# Patient Record
Sex: MALE | Race: WHITE | Employment: FULL TIME | URBAN - NONMETROPOLITAN AREA
[De-identification: names, ages, dates, MRNs, and addresses within clinical notes are randomized per-mention and may not be internally consistent; named-entity substitution may affect disease eponyms.]

---

## 2017-04-25 PROBLEM — S82.891D: Status: ACTIVE | Noted: 2017-04-25

## 2021-02-11 ENCOUNTER — HOSPITAL ENCOUNTER (OUTPATIENT)
Age: 56
Setting detail: OBSERVATION
Discharge: HOME | End: 2021-02-13
Attending: EMERGENCY MEDICINE | Admitting: INTERNAL MEDICINE
Payer: COMMERCIAL

## 2021-02-11 ENCOUNTER — APPOINTMENT (OUTPATIENT)
Dept: CT IMAGING | Age: 56
End: 2021-02-11

## 2021-02-11 ENCOUNTER — APPOINTMENT (OUTPATIENT)
Dept: GENERAL RADIOLOGY | Age: 56
End: 2021-02-11

## 2021-02-11 DIAGNOSIS — S09.90XA CLOSED HEAD INJURY, INITIAL ENCOUNTER: ICD-10-CM

## 2021-02-11 DIAGNOSIS — R55 SYNCOPE AND COLLAPSE: Primary | ICD-10-CM

## 2021-02-11 LAB
ALBUMIN SERPL-MCNC: 4.4 G/DL (ref 3.5–5.1)
ALP BLD-CCNC: 121 U/L (ref 38–126)
ALT SERPL-CCNC: 22 U/L (ref 11–66)
ANION GAP SERPL CALCULATED.3IONS-SCNC: 9 MEQ/L (ref 8–16)
AST SERPL-CCNC: 18 U/L (ref 5–40)
BASOPHILS # BLD: 0.9 %
BASOPHILS ABSOLUTE: 0.1 THOU/MM3 (ref 0–0.1)
BILIRUB SERPL-MCNC: 0.3 MG/DL (ref 0.3–1.2)
BUN BLDV-MCNC: 13 MG/DL (ref 7–22)
CALCIUM SERPL-MCNC: 9.4 MG/DL (ref 8.5–10.5)
CHLORIDE BLD-SCNC: 103 MEQ/L (ref 98–111)
CO2: 28 MEQ/L (ref 23–33)
CREAT SERPL-MCNC: 1.2 MG/DL (ref 0.4–1.2)
EKG ATRIAL RATE: 81 BPM
EKG P AXIS: 49 DEGREES
EKG P-R INTERVAL: 156 MS
EKG Q-T INTERVAL: 362 MS
EKG QRS DURATION: 84 MS
EKG QTC CALCULATION (BAZETT): 420 MS
EKG R AXIS: 47 DEGREES
EKG T AXIS: 30 DEGREES
EKG VENTRICULAR RATE: 81 BPM
EOSINOPHIL # BLD: 4.3 %
EOSINOPHILS ABSOLUTE: 0.3 THOU/MM3 (ref 0–0.4)
ERYTHROCYTE [DISTWIDTH] IN BLOOD BY AUTOMATED COUNT: 13.3 % (ref 11.5–14.5)
ERYTHROCYTE [DISTWIDTH] IN BLOOD BY AUTOMATED COUNT: 45.5 FL (ref 35–45)
GFR SERPL CREATININE-BSD FRML MDRD: 63 ML/MIN/1.73M2
GLUCOSE BLD-MCNC: 78 MG/DL (ref 70–108)
HCT VFR BLD CALC: 46.2 % (ref 42–52)
HEMOGLOBIN: 15 GM/DL (ref 14–18)
IMMATURE GRANS (ABS): 0.02 THOU/MM3 (ref 0–0.07)
IMMATURE GRANULOCYTES: 0.3 %
LYMPHOCYTES # BLD: 19.7 %
LYMPHOCYTES ABSOLUTE: 1.5 THOU/MM3 (ref 1–4.8)
MCH RBC QN AUTO: 30.1 PG (ref 26–33)
MCHC RBC AUTO-ENTMCNC: 32.5 GM/DL (ref 32.2–35.5)
MCV RBC AUTO: 92.8 FL (ref 80–94)
MONOCYTES # BLD: 8.4 %
MONOCYTES ABSOLUTE: 0.6 THOU/MM3 (ref 0.4–1.3)
NUCLEATED RED BLOOD CELLS: 0 /100 WBC
OSMOLALITY CALCULATION: 278.4 MOSMOL/KG (ref 275–300)
PLATELET # BLD: 228 THOU/MM3 (ref 130–400)
PMV BLD AUTO: 9.4 FL (ref 9.4–12.4)
POTASSIUM REFLEX MAGNESIUM: 3.6 MEQ/L (ref 3.5–5.2)
RBC # BLD: 4.98 MILL/MM3 (ref 4.7–6.1)
SEG NEUTROPHILS: 66.4 %
SEGMENTED NEUTROPHILS ABSOLUTE COUNT: 5 THOU/MM3 (ref 1.8–7.7)
SODIUM BLD-SCNC: 140 MEQ/L (ref 135–145)
TOTAL PROTEIN: 8 G/DL (ref 6.1–8)
TROPONIN T: < 0.01 NG/ML
WBC # BLD: 7.5 THOU/MM3 (ref 4.8–10.8)

## 2021-02-11 PROCEDURE — 84484 ASSAY OF TROPONIN QUANT: CPT

## 2021-02-11 PROCEDURE — 71045 X-RAY EXAM CHEST 1 VIEW: CPT

## 2021-02-11 PROCEDURE — 93005 ELECTROCARDIOGRAM TRACING: CPT | Performed by: EMERGENCY MEDICINE

## 2021-02-11 PROCEDURE — 99220 PR INITIAL OBSERVATION CARE/DAY 70 MINUTES: CPT | Performed by: INTERNAL MEDICINE

## 2021-02-11 PROCEDURE — 72125 CT NECK SPINE W/O DYE: CPT

## 2021-02-11 PROCEDURE — 36415 COLL VENOUS BLD VENIPUNCTURE: CPT

## 2021-02-11 PROCEDURE — 70450 CT HEAD/BRAIN W/O DYE: CPT

## 2021-02-11 PROCEDURE — 6370000000 HC RX 637 (ALT 250 FOR IP): Performed by: EMERGENCY MEDICINE

## 2021-02-11 PROCEDURE — 2580000003 HC RX 258: Performed by: EMERGENCY MEDICINE

## 2021-02-11 PROCEDURE — 85025 COMPLETE CBC W/AUTO DIFF WBC: CPT

## 2021-02-11 PROCEDURE — 99285 EMERGENCY DEPT VISIT HI MDM: CPT

## 2021-02-11 PROCEDURE — 80053 COMPREHEN METABOLIC PANEL: CPT

## 2021-02-11 RX ORDER — 0.9 % SODIUM CHLORIDE 0.9 %
1000 INTRAVENOUS SOLUTION INTRAVENOUS ONCE
Status: COMPLETED | OUTPATIENT
Start: 2021-02-11 | End: 2021-02-12

## 2021-02-11 RX ORDER — ACETAMINOPHEN 500 MG
1000 TABLET ORAL ONCE
Status: COMPLETED | OUTPATIENT
Start: 2021-02-12 | End: 2021-02-11

## 2021-02-11 RX ADMIN — SODIUM CHLORIDE 1000 ML: 9 INJECTION, SOLUTION INTRAVENOUS at 22:38

## 2021-02-11 RX ADMIN — ACETAMINOPHEN 1000 MG: 500 TABLET ORAL at 23:42

## 2021-02-11 ASSESSMENT — PAIN DESCRIPTION - FREQUENCY: FREQUENCY: CONTINUOUS

## 2021-02-11 ASSESSMENT — ENCOUNTER SYMPTOMS
ABDOMINAL PAIN: 0
PHOTOPHOBIA: 0
SORE THROAT: 0
COLOR CHANGE: 0
SINUS PRESSURE: 0
EYE REDNESS: 0
VOMITING: 0
CHEST TIGHTNESS: 0
NAUSEA: 0
COUGH: 0
BACK PAIN: 0

## 2021-02-11 ASSESSMENT — PAIN DESCRIPTION - PAIN TYPE: TYPE: ACUTE PAIN

## 2021-02-11 ASSESSMENT — PAIN SCALES - GENERAL: PAINLEVEL_OUTOF10: 7

## 2021-02-11 ASSESSMENT — PAIN DESCRIPTION - PROGRESSION: CLINICAL_PROGRESSION: NOT CHANGED

## 2021-02-11 NOTE — LETTER
[address] [facility] [address]  Phone: [phone]             February 13, 2021    Patient: Saeid Malik   YOB: 1965   Date of Visit: 2/11/2021       To Whom It May Concern:    Eduar Billings was seen and treated in our facility  beginning 2/11/2021 until 2/13/21. Patient may not drive or return to work until cleared by Cardiology.        Sincerely,       Brigido Kohler RN         Signature:__________________________________

## 2021-02-12 ENCOUNTER — APPOINTMENT (OUTPATIENT)
Dept: INTERVENTIONAL RADIOLOGY/VASCULAR | Age: 56
End: 2021-02-12

## 2021-02-12 LAB
ANION GAP SERPL CALCULATED.3IONS-SCNC: 11 MEQ/L (ref 8–16)
BUN BLDV-MCNC: 12 MG/DL (ref 7–22)
CALCIUM SERPL-MCNC: 9 MG/DL (ref 8.5–10.5)
CHLORIDE BLD-SCNC: 108 MEQ/L (ref 98–111)
CO2: 25 MEQ/L (ref 23–33)
CREAT SERPL-MCNC: 1.1 MG/DL (ref 0.4–1.2)
GFR SERPL CREATININE-BSD FRML MDRD: 69 ML/MIN/1.73M2
GLUCOSE BLD-MCNC: 128 MG/DL (ref 70–108)
GLUCOSE BLD-MCNC: 190 MG/DL (ref 70–108)
GLUCOSE BLD-MCNC: 98 MG/DL (ref 70–108)
GLUCOSE BLD-MCNC: 99 MG/DL (ref 70–108)
LV EF: 60 %
LVEF MODALITY: NORMAL
POTASSIUM REFLEX MAGNESIUM: 4.1 MEQ/L (ref 3.5–5.2)
SEDIMENTATION RATE, ERYTHROCYTE: 20 MM/HR (ref 0–10)
SODIUM BLD-SCNC: 144 MEQ/L (ref 135–145)
TROPONIN T: < 0.01 NG/ML
TSH SERPL DL<=0.05 MIU/L-ACNC: 0.41 UIU/ML (ref 0.4–4.2)

## 2021-02-12 PROCEDURE — G0378 HOSPITAL OBSERVATION PER HR: HCPCS

## 2021-02-12 PROCEDURE — 96372 THER/PROPH/DIAG INJ SC/IM: CPT

## 2021-02-12 PROCEDURE — 99244 OFF/OP CNSLTJ NEW/EST MOD 40: CPT | Performed by: NUCLEAR MEDICINE

## 2021-02-12 PROCEDURE — 93306 TTE W/DOPPLER COMPLETE: CPT

## 2021-02-12 PROCEDURE — 6360000002 HC RX W HCPCS: Performed by: STUDENT IN AN ORGANIZED HEALTH CARE EDUCATION/TRAINING PROGRAM

## 2021-02-12 PROCEDURE — 80048 BASIC METABOLIC PNL TOTAL CA: CPT

## 2021-02-12 PROCEDURE — 93010 ELECTROCARDIOGRAM REPORT: CPT | Performed by: INTERNAL MEDICINE

## 2021-02-12 PROCEDURE — 36415 COLL VENOUS BLD VENIPUNCTURE: CPT

## 2021-02-12 PROCEDURE — 2580000003 HC RX 258: Performed by: STUDENT IN AN ORGANIZED HEALTH CARE EDUCATION/TRAINING PROGRAM

## 2021-02-12 PROCEDURE — 85651 RBC SED RATE NONAUTOMATED: CPT

## 2021-02-12 PROCEDURE — 86038 ANTINUCLEAR ANTIBODIES: CPT

## 2021-02-12 PROCEDURE — 82948 REAGENT STRIP/BLOOD GLUCOSE: CPT

## 2021-02-12 PROCEDURE — 84443 ASSAY THYROID STIM HORMONE: CPT

## 2021-02-12 PROCEDURE — 93880 EXTRACRANIAL BILAT STUDY: CPT

## 2021-02-12 PROCEDURE — 84484 ASSAY OF TROPONIN QUANT: CPT

## 2021-02-12 PROCEDURE — 99225 PR SBSQ OBSERVATION CARE/DAY 25 MINUTES: CPT | Performed by: INTERNAL MEDICINE

## 2021-02-12 RX ORDER — ACETAMINOPHEN 650 MG/1
650 SUPPOSITORY RECTAL EVERY 6 HOURS PRN
Status: DISCONTINUED | OUTPATIENT
Start: 2021-02-12 | End: 2021-02-13 | Stop reason: HOSPADM

## 2021-02-12 RX ORDER — SODIUM CHLORIDE 0.9 % (FLUSH) 0.9 %
10 SYRINGE (ML) INJECTION PRN
Status: DISCONTINUED | OUTPATIENT
Start: 2021-02-12 | End: 2021-02-13 | Stop reason: HOSPADM

## 2021-02-12 RX ORDER — SODIUM CHLORIDE 9 MG/ML
INJECTION, SOLUTION INTRAVENOUS CONTINUOUS
Status: DISCONTINUED | OUTPATIENT
Start: 2021-02-12 | End: 2021-02-13

## 2021-02-12 RX ORDER — ACETAMINOPHEN 325 MG/1
650 TABLET ORAL EVERY 6 HOURS PRN
Status: DISCONTINUED | OUTPATIENT
Start: 2021-02-12 | End: 2021-02-13 | Stop reason: HOSPADM

## 2021-02-12 RX ORDER — ONDANSETRON 2 MG/ML
4 INJECTION INTRAMUSCULAR; INTRAVENOUS EVERY 6 HOURS PRN
Status: DISCONTINUED | OUTPATIENT
Start: 2021-02-12 | End: 2021-02-13 | Stop reason: HOSPADM

## 2021-02-12 RX ORDER — SODIUM CHLORIDE 0.9 % (FLUSH) 0.9 %
10 SYRINGE (ML) INJECTION EVERY 12 HOURS SCHEDULED
Status: DISCONTINUED | OUTPATIENT
Start: 2021-02-12 | End: 2021-02-13 | Stop reason: HOSPADM

## 2021-02-12 RX ORDER — POLYETHYLENE GLYCOL 3350 17 G/17G
17 POWDER, FOR SOLUTION ORAL DAILY PRN
Status: DISCONTINUED | OUTPATIENT
Start: 2021-02-12 | End: 2021-02-13 | Stop reason: HOSPADM

## 2021-02-12 RX ORDER — PROMETHAZINE HYDROCHLORIDE 25 MG/1
12.5 TABLET ORAL EVERY 6 HOURS PRN
Status: DISCONTINUED | OUTPATIENT
Start: 2021-02-12 | End: 2021-02-13 | Stop reason: HOSPADM

## 2021-02-12 RX ADMIN — ENOXAPARIN SODIUM 40 MG: 40 INJECTION SUBCUTANEOUS at 08:56

## 2021-02-12 RX ADMIN — SODIUM CHLORIDE: 9 INJECTION, SOLUTION INTRAVENOUS at 01:25

## 2021-02-12 ASSESSMENT — PAIN SCALES - GENERAL
PAINLEVEL_OUTOF10: 0

## 2021-02-12 ASSESSMENT — PAIN SCALES - WONG BAKER
WONGBAKER_NUMERICALRESPONSE: 0
WONGBAKER_NUMERICALRESPONSE: 0

## 2021-02-12 NOTE — ED NOTES
ED to inpatient nurses report    Chief Complaint   Patient presents with    Loss of Consciousness     Work injury     Fall      Present to ED from work  LOC: alert and orientated to name, place, date  Vital signs   Vitals:    02/11/21 2044 02/11/21 2233   BP: (!) 150/89    Pulse: 98    Resp: 16 16   Temp: 97.6 °F (36.4 °C)    TempSrc: Oral    SpO2: 99% 99%   Weight: 232 lb (105.2 kg)    Height: 6' 2\" (1.88 m)       Oxygen Baseline room air     Current needs required none Bipap/Cpap No  LDAs:   Peripheral IV 02/11/21 Right Antecubital (Active)   Site Assessment Clean;Dry; Intact 02/11/21 2113   Line Status Blood return noted; Flushed;Specimen collected;Normal saline locked 02/11/21 2113   Dressing Status Clean;Dry; Intact 02/11/21 2113     Mobility: Independent  Pending ED orders: None   Present condition: Stable     Electronically signed by Monika Marquez RN on 2/12/2021 at 12:03 AM       Monika Marquez RN  02/12/21 0003

## 2021-02-12 NOTE — CARE COORDINATION
2/12/21, 10:09 AM EST  DISCHARGE PLANNING EVALUATION:    Tiffanie Sandoval       Admitted: 2/11/2021/ 2041   Hospital day: 0   Location: [address] [place] [address]A Reason for admit: Syncope, unspecified syncope type [R55]   PMH:  has a past medical history of Arthritis. Procedure: No  Barriers to Discharge: To ER with LOC at work with resulting fall and lt maxilla abrasion/contusion. Ortho VS. Consult to Cardiology. Telemetry. Neuro checks. IVF at 100/hr. PCP: Kane Lara MD    Patient Goals/Plan/Treatment Preferences: Met with pt today. From home with spouse and other family members. No services or DME. Pt states he works but uninsured. He has a PCP, he drives and no issues with meds. This CM phoned Jaiden Leija in [employer] to request a visit with pt regarding insurance. Transportation/Food Security/Housekeeping Addressed:  No issues identified. 2/12/21, 1:44 PM EST    Patient goals/plan/ treatment preferences discussed by  and . Patient goals/plan/ treatment preferences reviewed with patient/ family. Patient/ family verbalize understanding of discharge plan and are in agreement with goal/plan/treatment preferences. Understanding was demonstrated using the teach back method. AVS provided by RN at time of discharge, which includes all necessary medical information pertaining to the patients current course of illness, treatment, post-discharge goals of care, and treatment preferences. Potential discharge in next 24-48 hours. No needs voiced at present time.

## 2021-02-12 NOTE — ED NOTES
Pt resting in bed with call light in reach and friend at bedside. No distress noted at this time, no needs stated.        [facility]  02/11/21 [id number]

## 2021-02-12 NOTE — ED NOTES
Pt to ED from work for syncopal episode that occurred at approximately 1930 this evening, as pt attempted to stand from sitting position and fell face first without breaking fall with arms. Pt has abrasion to left cheek and right elbow pain with no bruising present at this time. Pt states headache with pain rated 7/10 at this time. Pt states no blurred vision or any other symptoms at this time. No distress noted.        [facility]  02/11/21 2052

## 2021-02-12 NOTE — ED PROVIDER NOTES
[facility] ENCOUNTER          Pt Name: Piyush Bishop  MRN: [id number]  [place] 1965  Date of evaluation: 2/11/2021  Emergency Physician: Johnna Cordero DO    CHIEF COMPLAINT       Chief Complaint   Patient presents with    Loss of Consciousness     Work injury     Fall     History obtained from the patient. HISTORY OF PRESENT ILLNESS    HPI  Piyush Bishop is a 54 y.o. male who presents to the emergency department for evaluation of syncope. Patient was sitting on a chair. He states he fell forward. He states it was not related to position change. His coworker was with him states he was slumped over and saw him fall to the floor. He states he was unprotected and struck his left side of his face on the floor. Reports 7 out of 10 left-sided facial pain. The patient has no other acute complaints at this time. REVIEW OF SYSTEMS   Review of Systems   Constitutional: Negative for activity change, chills and fever. HENT: Negative for congestion, sinus pressure and sore throat. Eyes: Negative for photophobia, redness and visual disturbance. Respiratory: Negative for cough and chest tightness. Cardiovascular: Negative for chest pain, palpitations and leg swelling. Gastrointestinal: Negative for abdominal pain, nausea and vomiting. Endocrine: Negative for polydipsia and polyuria. Genitourinary: Negative for decreased urine volume, difficulty urinating, dysuria, flank pain, frequency and urgency. Musculoskeletal: Negative for back pain, myalgias and neck pain. Skin: Negative for color change and rash. Neurological: Positive for syncope and headaches. Negative for weakness. Hematological: Negative for adenopathy. Does not bruise/bleed easily. Psychiatric/Behavioral: Negative for confusion and self-injury. PAST MEDICAL AND SURGICAL HISTORY   History reviewed. No pertinent past medical history.   Past Surgical History:   Procedure Laterality Date    ANKLE FRACTURE SURGERY Right 04/25/2017    ORIF    KNEE SURGERY           MEDICATIONS   No current facility-administered medications for this encounter. No current outpatient medications on file. SOCIAL HISTORY     Social History     Social History Narrative    Not on file     Social History     Tobacco Use    Smoking status: Current Every Day Smoker     Packs/day: 0.50     Years: 30.00     Pack years: 15.00     Types: Cigarettes   Substance Use Topics    Alcohol use: No    Drug use: No         ALLERGIES   No Known Allergies      FAMILY HISTORY   History reviewed. No pertinent family history. PHYSICAL EXAM     ED Triage Vitals [02/11/21 2044]   BP Temp Temp Source Pulse Resp SpO2 Height Weight   (!) 150/89 97.6 °F (36.4 °C) Oral 98 16 99 % 6' 2\" (1.88 m) 232 lb (105.2 kg)         Additional Vital Signs:  Vitals:    02/11/21 2233   BP:    Pulse:    Resp: 16   Temp:    SpO2: 99%       Physical Exam  Vitals signs and nursing note reviewed. Constitutional:       General: He is not in acute distress. Appearance: He is well-developed. He is not diaphoretic. HENT:      Head: Normocephalic. Comments: Left maxilla abrasion and contusion. Eyes:      Pupils: Pupils are equal, round, and reactive to light. Neck:      Musculoskeletal: Normal range of motion and neck supple. No muscular tenderness. Vascular: No JVD. Cardiovascular:      Rate and Rhythm: Normal rate and regular rhythm. Heart sounds: Normal heart sounds. Pulmonary:      Effort: Pulmonary effort is normal.      Breath sounds: Normal breath sounds. Abdominal:      General: Bowel sounds are normal. There is no distension. Palpations: Abdomen is soft. Tenderness: There is no abdominal tenderness. Musculoskeletal: Normal range of motion. General: No tenderness or deformity. Skin:     General: Skin is warm and dry.       Capillary Refill: Capillary refill takes less than 2 seconds. Neurological:      Mental Status: He is alert and oriented to person, place, and time. Comments: Non-focal. Moves all extremities. Initial vital signs and nursing assessment reviewed and abnormal from Hypertension. Pulsoximetry is normal per my interpretation. MEDICAL DECISION MAKING   Initial Assessment: Given the patient's above chief complaint and findings on history and physical examination, I thought it was appropriate to consider the following emergency medical conditions: Syncope, dysrhythmia, anemia, orthostasis, ACS, closed head injury although some of these diagnoses are unlikely they were considered in my medical decision making. Plan: CBC, BMP, ECG, troponin, orthostatic vital signs symptomatic treatment with IV hydration, and reassess         ED RESULTS   Laboratory results:  Labs Reviewed   CBC WITH AUTO DIFFERENTIAL - Abnormal; Notable for the following components:       Result Value    RDW-SD 45.5 (*)     All other components within normal limits   GLOMERULAR FILTRATION RATE, ESTIMATED - Abnormal; Notable for the following components:    Est, Glom Filt Rate 63 (*)     All other components within normal limits   COMPREHENSIVE METABOLIC PANEL W/ REFLEX TO MG FOR LOW K   ANION GAP   OSMOLALITY   TROPONIN       Radiologic studies results:  CT HEAD WO CONTRAST   Final Result   Impression:   1. No acute intracranial hemorrhage or process identified. This document has been electronically signed by: Nerissa Costa MD on    02/11/2021 11:07 PM      All CTs at this facility use dose modulation techniques and iterative    reconstructions, and/or weight-based dosing   when appropriate to reduce radiation to a low as reasonably achievable. CT CERVICAL SPINE WO CONTRAST   Final Result   Impression:   1. No acute cervical spine fracture identified.    2.  Multilevel degenerative disease of the cervical spine      This document has been electronically signed by: Jean-Claude Carrillo MD on    02/11/2021 11:09 PM      All CTs at this facility use dose modulation techniques and iterative    reconstructions, and/or weight-based dosing   when appropriate to reduce radiation to a low as reasonably achievable. XR CHEST PORTABLE   Final Result   Impression:   1. No acute cardiopulmonary disease seen. This document has been electronically signed by: Jean-Claude Carrillo MD on    02/11/2021 11:03 PM          ED Medications administered this visit:   Medications   0.9 % sodium chloride bolus (1,000 mLs Intravenous New Bag 2/11/21 9608)   acetaminophen (TYLENOL) tablet 1,000 mg (1,000 mg Oral Given 2/11/21 2342)     ECG with normal sinus rhythm no acute ST-T wave changes normal QT and IN intervals. ED COURSE      Patient admitted to hospitalist service unprovoked syncope for telemetry and cardiac monitoring. Discussed with hospitalist patient accepted for admission. The diagnosis, extensive differential diagnosis, laboratory and imaging findings were discussed at the bedside. The patient was an active participant in their care. They are agreeable to the plan of care. All questions and concerns were addressed at the time of the encounter. MEDICATION CHANGES     DISCHARGE MEDICATIONS:  New Prescriptions    No medications on file            FINAL DISPOSITION     Final diagnoses:   Syncope and collapse   Closed head injury, initial encounter     Condition: condition: good  Dispo: Admit to telemetry    PATIENT REFERRED TO:  No follow-up provider specified. Critical Care Time   None      This transcription was electronically signed. Parts of this transcriptions may have been dictated by use of voice recognition software and electronically transcribed, and parts may have been transcribed with the assistance of an ED scribe. The transcription may contain errors not detected in proofreading.     Electronically Signed: Karen Montes, 02/11/21, 11:43 PM      Karen Montes

## 2021-02-12 NOTE — H&P
History & Physical        Patient:  Jose Armando Leon  YOB: 1965    MRN: [id number]     Acct: [de-identified]    PCP: Juwan Miramontes MD    Date of Admission: 2/11/2021    Date of Service: Pt seen/examined on 02/12/21  and Admitted to Observation with expected LOS less than two midnights due to medical therapy. Chief Complaint:  Syncope    ASSESSMENT/PLAN:    Syncope: Suspect vasovagal. Patient had witnessed mechanical fall from seated position at lunch time. Differential diagnosis includes arrhythmia, structural heart, reflex, orthostasis. Orthostatic vitals negative, EKG negative with no events on monitor, no leg swelling or edema appreciated on exam.  Patient has no known pre-existing medical conditions. Family Hx for MS & early MI in mother. Recommend Echo outpatient for evaluation of structural heart disease. Elevated EGFR: Estimated GFR on arrival 63. Unclear if this represents new KRIS or existing CKD. No hx of diabetes or HTN. Check HbA1c, ESR, CRP, BIN. Recommend follow-up BMP on discharge to evaluate for chronic kidney disease. Will administer IVF overnight and assess BMP in AM      History Of Present Illness:    Julio C Marin is a 42-year-old current everyday smoker with a past medical history significant only for lower extremity fractures who presents to [facility] ED on 2/12/2021 following an acute syncopal episode. Patient had been sitting in a chair eating lunch at time of event. Patient next remembers waking up on the floor. Witnesses report patient slumped out of chair falling onto his left side. Patient reports he has not skipped any meals and was well-hydrated at the time of the event. No hx of recent illness, fevers/chills, n/v/d, or recent life stressors. 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 Patient reports 7/10 aching periorbital pain and headache but otherwise has no symptoms at time of interview.   Patient has no history of diabetes, hypertension, heart disease. Family history significant for MI (age 35) and multiple sclerosis in mother. Patient vitals on arrival T 97.6 RR 16 HR 98 /89 SPO2 99% on room air. Troponin negative on arrival.  CBC demonstrates WBC, H&H, platelets WNL. BMP demonstrates serum glucose of 78, BUN/creatinine 13/1.2 otherwise unremarkable. Liver enzymes WNL. CT head and neck demonstrates no acute process, CXR no acute process. Orthostatic vitals WNL. Patient was noted to have contusions over face on arrival and was given 1 L NS and 1 g Tylenol. Admitted for overnight observation. Past Medical History:          Diagnosis Date    Arthritis        Past Surgical History:          Procedure Laterality Date    ANKLE FRACTURE SURGERY Right 04/25/2017    ORIF    FRACTURE SURGERY      KNEE SURGERY         Medications Prior to Admission:      Prior to Admission medications    Not on File       Allergies:  Patient has no known allergies. Social History:      The patient currently lives at home with wife    TOBACCO:   reports that he has been smoking cigarettes. He has a 15.00 pack-year smoking history. He has never used smokeless tobacco.  ETOH:   reports no history of alcohol use. Family History:       Reviewed in detail and negative for DM, CAD, Cancer, CVA. Positive as follows:          Problem Relation Age of Onset    Heart Attack Mother 35    Mult Sclerosis Mother    Patient never met father    Diet:  DIET GENERAL;    REVIEW OF SYSTEMS:   Pertinent positives as noted in the HPI. All other systems reviewed and negative. PHYSICAL EXAM:    BP (!) 150/88   Pulse 76   Temp 98.1 °F (36.7 °C) (Oral)   Resp 18   Ht 6' 2\" (1.88 m)   Wt 232 lb (105.2 kg)   SpO2 98%   BMI 29.79 kg/m²     General appearance: Middle-aged white male alert and oriented x3 in no acute distress  HEENT: Edentulous, normal cephalic, atraumatic without obvious deformity. Pupils equal, round, and reactive to light.   Extra appropriate to reduce radiation to a low as reasonably achievable. CT CERVICAL SPINE WO CONTRAST   Final Result   Impression:   1. No acute cervical spine fracture identified. 2.  Multilevel degenerative disease of the cervical spine      This document has been electronically signed by: Michael Browne MD on    02/11/2021 11:09 PM      All CTs at this facility use dose modulation techniques and iterative    reconstructions, and/or weight-based dosing   when appropriate to reduce radiation to a low as reasonably achievable. XR CHEST PORTABLE   Final Result   Impression:   1. No acute cardiopulmonary disease seen. This document has been electronically signed by: Michael Browne MD on    02/11/2021 11:03 PM          CXR: I have reviewed the CXR with the following interpretation: No acute process   EKG:  I have reviewed the EKG with the following interpretation: NSR w/ peaked T waves V2  CT Head: Small area of enhancement noted at superior aspect of central sulcus  CT Neck: Demonstrates disc narrowing suggestive of DDD    CT HEAD WO CONTRAST   Final Result   Impression:   1. No acute intracranial hemorrhage or process identified. This document has been electronically signed by: Michael Browne MD on    02/11/2021 11:07 PM      All CTs at this facility use dose modulation techniques and iterative    reconstructions, and/or weight-based dosing   when appropriate to reduce radiation to a low as reasonably achievable. CT CERVICAL SPINE WO CONTRAST   Final Result   Impression:   1. No acute cervical spine fracture identified. 2.  Multilevel degenerative disease of the cervical spine      This document has been electronically signed by: Michael Browne MD on    02/11/2021 11:09 PM      All CTs at this facility use dose modulation techniques and iterative    reconstructions, and/or weight-based dosing   when appropriate to reduce radiation to a low as reasonably achievable.       XR CHEST

## 2021-02-12 NOTE — PROGRESS NOTES
Hospitalist Progress Note      Patient:  Se Melissa    Unit/Bed:8B-37/037-A  YOB: 1965  MRN: [id number]   Acct: [de-identified]     PCP: Kristan Choudhury MD  Date of Admission: 2/11/2021    Chief Complaint:  Syncope    ASSESSMENT/PLAN:    Syncope: Suspect vasovagal. Patient had witnessed mechanical fall from seated position at lunch time. Differential diagnosis includes arrhythmia, structural heart, reflex, orthostasis. Orthostatic vitals negative, EKG negative    2/12-we will consult cardiology given with no prodromal symptoms- questionable arrhythmia?- we will add 2D echo of the heart, cardiology consulted, carotid Dopplers, do orthostats-likely will need Holter monitor at discharge        History Of Present Illness:    Darlene Kennedy is a 49-year-old current everyday smoker with a past medical history significant only for lower extremity fractures who presents to [facility] ED on 2/12/2021 following an acute syncopal episode. Patient had been sitting in a chair eating lunch at time of event. Patient next remembers waking up on the floor. Witnesses report patient slumped out of chair falling onto his left side. Patient reports he has not skipped any meals and was well-hydrated at the time of the event. No hx of recent illness, fevers/chills, n/v/d, or recent life stressors. 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 Patient reports 7/10 aching periorbital pain and headache but otherwise has no symptoms at time of interview. Patient has no history of diabetes, hypertension, heart disease. Family history significant for MI (age 35) and multiple sclerosis in mother. Patient vitals on arrival T 97.6 RR 16 HR 98 /89 SPO2 99% on room air. Troponin negative on arrival.  CBC demonstrates WBC, H&H, platelets WNL. BMP demonstrates serum glucose of 78, BUN/creatinine 13/1.2 otherwise unremarkable. Liver enzymes WNL.   CT head and neck demonstrates no acute process, CXR no acute process. Orthostatic vitals WNL. Patient was noted to have contusions over face on arrival and was given 1 L NS and 1 g Tylenol. Admitted for overnight observation. Subjective:- (Last 24 hours)    Doing okay  No dizziness no chest pain no shortness of breath no nausea no vomiting    Past medical history, family history, social history and allergies reviewed again and is unchanged since admission. ROS (12 point review of systems completed. Pertinent positives noted. Otherwise ROS is negative)      Scheduled Meds:   sodium chloride flush  10 mL Intravenous 2 times per day    enoxaparin  40 mg Subcutaneous Daily     Continuous Infusions:   sodium chloride 100 mL/hr at 02/12/21 0125     PRN Meds:.sodium chloride flush, promethazine **OR** ondansetron, polyethylene glycol, acetaminophen **OR** acetaminophen     Diet:  DIET GENERAL;    REVIEW OF SYSTEMS:   Pertinent positives as noted in the HPI. All other systems reviewed and negative. PHYSICAL EXAM:    /77   Pulse 79   Temp 98.2 °F (36.8 °C) (Oral)   Resp 18   Ht 6' 2\" (1.88 m)   Wt 232 lb (105.2 kg)   SpO2 98%   BMI 29.79 kg/m²     General appearance: Middle-aged white male alert and oriented x3 in no acute distress  HEENT: normal cephalic, atraumatic without obvious deformity. Pupils equal, round, and reactive to light. Extra ocular muscles intact. Conjunctivae/corneas clear. Neck: Supple, with full range of motion. No jugular venous distention. Trachea midline. Respiratory:  Normal respiratory effort. Clear to auscultation, bilaterally without Rales/Wheezes/Rhonchi. Cardiovascular:  Regular rate and rhythm with normal S1/S2 without murmurs, rubs, gallops, or ectopic beat  Abdomen: soft, non-tender, non-distended with normal bowel sounds. Musculoskeletal:  No clubbing, cyanosis or edema bilaterally. Skin: Skin color, texture, turgor normal.  No rashes or lesions. Neurologic:  Cranial nerves: II-XII intact. and/or weight-based dosing   when appropriate to reduce radiation to a low as reasonably achievable. CT CERVICAL SPINE WO CONTRAST   Final Result   Impression:   1. No acute cervical spine fracture identified. 2.  Multilevel degenerative disease of the cervical spine      This document has been electronically signed by: Maxwell Dillon MD on    02/11/2021 11:09 PM      All CTs at this facility use dose modulation techniques and iterative    reconstructions, and/or weight-based dosing   when appropriate to reduce radiation to a low as reasonably achievable. XR CHEST PORTABLE   Final Result   Impression:   1. No acute cardiopulmonary disease seen. This document has been electronically signed by: Maxwell Dillon MD on    02/11/2021 11:03 PM          CXR: I have reviewed the CXR with the following interpretation: No acute process   EKG:  I have reviewed the EKG with the following interpretation: NSR w/ peaked T waves V2  CT Head: Small area of enhancement noted at superior aspect of central sulcus  CT Neck: Demonstrates disc narrowing suggestive of DDD    VL DUP CAROTID BILATERAL   Final Result      1. RIGHT ICA . Darrell Dirk Darrell Dirk Unremarkable . Darrell Dirk 2. RIGHT ECA. Darrell Dirk Unremarkable . .... Darrell Dirk RIGHT CCA. Darrell Dirk Unremarkable . Darrell Dirk 3. RIGHT VERTEBRAL. Darrell Dirk Antegrade flow . .. ... LEFT VERTEBRAL. .. Antegrade flow. ..      4. LEFT ICA. .... Unremarkable. .   5. LEFT ECA. .. Unremarkable. .... LEFT CCA. .. Unremarkable. **This report has been created using voice recognition software. It may contain minor errors which are inherent in voice recognition technology. **      Final report electronically signed by Dr. Elida Boyd on 2/12/2021 1:49 PM      CT HEAD WO CONTRAST   Final Result   Impression:   1. No acute intracranial hemorrhage or process identified.       This document has been electronically signed by: Maxwell Dillon MD on    02/11/2021 11:07 PM      All CTs at this facility use dose modulation techniques and iterative    reconstructions, and/or weight-based dosing   when appropriate to reduce radiation to a low as reasonably achievable. CT CERVICAL SPINE WO CONTRAST   Final Result   Impression:   1. No acute cervical spine fracture identified. 2.  Multilevel degenerative disease of the cervical spine      This document has been electronically signed by: Mely Mobley MD on    02/11/2021 11:09 PM      All CTs at this facility use dose modulation techniques and iterative    reconstructions, and/or weight-based dosing   when appropriate to reduce radiation to a low as reasonably achievable. XR CHEST PORTABLE   Final Result   Impression:   1. No acute cardiopulmonary disease seen. This document has been electronically signed by: Mely Mobley MD on    02/11/2021 11:03 PM           DVT prophylaxis: Lovenox    Code Status: Full Code      PT/OT Eval Status: not indicated    Disposition:Home        Thank you Eligio Benito MD for the opportunity to be involved in this patient's care.     Electronically signed by Alvaro Blount MD on 2/12/2021 at 6:50 PM

## 2021-02-13 VITALS
HEIGHT: 74 IN | HEART RATE: 66 BPM | BODY MASS INDEX: 29.77 KG/M2 | OXYGEN SATURATION: 98 % | SYSTOLIC BLOOD PRESSURE: 139 MMHG | WEIGHT: 232 LBS | DIASTOLIC BLOOD PRESSURE: 75 MMHG | RESPIRATION RATE: 18 BRPM | TEMPERATURE: 97.6 F

## 2021-02-13 LAB
GLUCOSE BLD-MCNC: 107 MG/DL (ref 70–108)
GLUCOSE BLD-MCNC: 116 MG/DL (ref 70–108)
GLUCOSE BLD-MCNC: 124 MG/DL (ref 70–108)
TROPONIN T: < 0.01 NG/ML

## 2021-02-13 PROCEDURE — 82948 REAGENT STRIP/BLOOD GLUCOSE: CPT

## 2021-02-13 PROCEDURE — 99217 PR OBSERVATION CARE DISCHARGE MANAGEMENT: CPT | Performed by: NURSE PRACTITIONER

## 2021-02-13 PROCEDURE — 93270 REMOTE 30 DAY ECG REV/REPORT: CPT

## 2021-02-13 PROCEDURE — G0378 HOSPITAL OBSERVATION PER HR: HCPCS

## 2021-02-13 PROCEDURE — 99219 PR INITIAL OBSERVATION CARE/DAY 50 MINUTES: CPT | Performed by: PSYCHIATRY & NEUROLOGY

## 2021-02-13 PROCEDURE — 36415 COLL VENOUS BLD VENIPUNCTURE: CPT

## 2021-02-13 PROCEDURE — 84484 ASSAY OF TROPONIN QUANT: CPT

## 2021-02-13 NOTE — PROGRESS NOTES
Cardiology Progress Note      Patient:  Ludwig Asif  YOB: 1965  MRN: [id number]   Acct: [de-identified]  Admit Date:  2/11/2021  Primary Cardiologist: Seen by Dr. Dary Quinones    Per Dr. Harman Salazar consult note:  REASON FOR CONSULTATION:  Syncope.     REQUESTING PROVIDER:  Hospitalist Service.     HISTORY OF PRESENT ILLNESS:  This is a 27-year-old gentleman with no  known cardiac history whose main medical or at least medically related  problem is history of smoking. Otherwise, he does not have any other  medical problems. He comes in with a syncopal episode. No specific  preceding warning signs. No specific triggers. No specific associated  symptoms. He was out for a short period of time and subsequently came  out of it without any residual deficits. We were consulted to assist in  the management of the patient. Upon further interviewing the patient,  he denied any previous syncopal episode, he denied any significant  dizziness or lightheadedness. He does have risk factors for coronary  artery disease; however, he denies any active chest pain. He does have  some shortness of breath that seems to be rather mild. No recent chest  pain. No obvious palpitation or arrhythmia. Subjective (Events in last 24 hours): F/U syncope. Resting in bed in nad on room air. Denies any further syncope, dizziness, lightheadedness. Denies chest pain, palpitations, sob.        Objective:   /84   Pulse 67   Temp 97.8 °F (36.6 °C) (Oral)   Resp 18   Ht 6' 2\" (1.88 m)   Wt 232 lb (105.2 kg)   SpO2 96%   BMI 29.79 kg/m²        TELEMETRY: SR    Physical Exam:  General Appearance: alert and oriented to person, place and time, in no acute distress  Cardiovascular: normal rate, regular rhythm, normal S1 and S2, no murmurs, rubs, clicks, or gallops, distal pulses intact, no carotid bruits, no JVD  Pulmonary/Chest: clear to auscultation bilaterally- no wheezes, rales or rhonchi, normal air movement, no respiratory distress  Abdomen: soft, non-tender, non-distended, normal bowel sounds, no masses   Extremities: no cyanosis, clubbing or edema, pulses palpable  Skin: warm and dry, no rash or erythema  Head: normocephalic and atraumatic  Eyes: pupils equal, round, and reactive to light  Neck: supple and non-tender without mass, no thyromegaly   Musculoskeletal: normal range of motion, no joint swelling, deformity or tenderness  Neurological: alert, oriented, normal speech, no focal findings or movement disorder noted    Medications:    sodium chloride flush  10 mL Intravenous 2 times per day    enoxaparin  40 mg Subcutaneous Daily           sodium chloride flush, 10 mL, PRN      promethazine, 12.5 mg, Q6H PRN    Or      ondansetron, 4 mg, Q6H PRN      polyethylene glycol, 17 g, Daily PRN      acetaminophen, 650 mg, Q6H PRN    Or      acetaminophen, 650 mg, Q6H PRN        Diagnostics:  EK21  Normal sinus rhythm  Normal ECG  When compared with ECG of 2017 15:42,  No significant change was found  Confirmed by Rekha Law MD, Do Vargas   Echo: 21  Summary   Ejection fraction is visually estimated at 60%. Overall left ventricular function is normal.      Signature      ----------------------------------------------------------------   Electronically signed by Shady Garcia MD       Lab Data:    Cardiac Enzymes:  No results for input(s): CKTOTAL, CKMB, CKMBINDEX, TROPONINI in the last 72 hours.     CBC:   Lab Results   Component Value Date    WBC 7.5 2021    RBC 4.98 2021    HGB 15.0 2021    HCT 46.2 2021     2021       CMP:    Lab Results   Component Value Date     2021    K 4.1 2021     2021    CO2 25 2021    BUN 12 2021    CREATININE 1.1 2021    LABGLOM 69 2021    GLUCOSE 99 2021    CALCIUM 9.0 2021       Hepatic Function Panel:    Lab Results   Component Value Date    ALKPHOS 121 2021    ALT 22 02/11/2021    AST 18 02/11/2021    PROT 8.0 02/11/2021    BILITOT 0.3 02/11/2021    LABALBU 4.4 02/11/2021       Magnesium:  No results found for: MG    PT/INR:  No results found for: PROTIME, INR    HgBA1c:  No results found for: LABA1C    FLP:  No results found for: TRIG, HDL, LDLCALC, LDLDIRECT, LABVLDL    TSH:    Lab Results   Component Value Date    TSH 0.414 02/12/2021         Assessment:  · Syncopal episode with fall: trops (-) x 2, No EKG abnormalities, Echo 60, normal LV function  Carotid dopplers wnl  CT head nothing acute    Discussed with Dr. Milton Narayanan:  · Neurology consult pending  · Event monitor at discharge  · OP stress and tilt table, office will schedule and notify  · Recommend no driving or operating heavy machinery until testing complete and cleared. · F/U with Dr. Dayr Quinones in 6 weeks after testing and event completed.           Electronically signed by ISABELLE Baron CNP on 2/13/2021 at 11:34 AM

## 2021-02-13 NOTE — CONSULTS
[address] [place] [address]                                  CONSULTATION    PATIENT NAME: Ernestina Sue                    :        1965  MED REC NO:   [id number]                           ROOM:       [id number]  ACCOUNT NO:   [de-identified]                           ADMIT DATE: 2021  PROVIDER:     DENNY Sams Sales DATE:  2021    CARDIOLOGY CONSULTATION    REASON FOR CONSULTATION:  Syncope. REQUESTING PROVIDER:  Hospitalist Service. HISTORY OF PRESENT ILLNESS:  This is a 51-year-old gentleman with no  known cardiac history whose main medical or at least medically related  problem is history of smoking. Otherwise, he does not have any other  medical problems. He comes in with a syncopal episode. No specific  preceding warning signs. No specific triggers. No specific associated  symptoms. He was out for a short period of time and subsequently came  out of it without any residual deficits. We were consulted to assist in  the management of the patient. Upon further interviewing the patient,  he denied any previous syncopal episode, he denied any significant  dizziness or lightheadedness. He does have risk factors for coronary  artery disease; however, he denies any active chest pain. He does have  some shortness of breath that seems to be rather mild. No recent chest  pain. No obvious palpitation or arrhythmia. REVIEW OF SYSTEMS:  No fever, no chills, no weight loss. No hematuria  or dysuria. No abdominal pain, nausea, vomiting, or diarrhea. No  obvious active psych problems or suicidal ideation. No skin rashes. No  obvious dizziness, lightheadedness or loss of consciousness except what  is mentioned above. No recent trauma. No bleeding problem. PAST MEDICAL HISTORY:  1. No known history of coronary artery disease. 2.  No known history of arrhythmia. ALLERGIES:  No known drug allergies. MEDICATIONS:  None. SOCIAL HISTORY:  History of smoking. No alcohol or drug abuse reported. FAMILY HISTORY:  Noncontributory. Some members with coronary artery  disease. PHYSICAL EXAMINATION:  VITAL SIGNS:  Showed a blood pressure of 110/70, heart rate of 70. GENERAL APPEARANCE:  Pleasant gentleman in no obvious acute distress. EYES AND EARS:  No discharge. NECK:  No JVD. No bruits. No masses. LUNGS:  Decreased air entry. No crackles. No wheezes. HEART:  Normal S1, S2. Systolic murmur grade 2/6. ABDOMEN:  Soft, nontender. Positive bowel sounds. No organomegaly. EXTREMITIES:  No significant edema. NEUROLOGIC:  Grossly intact. Awake, alert. No focal deficits. PSYCH:  No evidence of active psychosis. SKIN:  No rashes. LABORATORY DATA:  Showed sodium 144, potassium 4.1, BUN 12, creatinine  1.1. Troponin less than 0.01. White count 7.5, hemoglobin 15,  hematocrit 46.2, platelets 172. EKG showed sinus rhythm, unremarkable  otherwise. IMPRESSION:  This is a gentleman who comes in with syncope without any  specific evidence of any particular causes. At this point, I did have a  discussion with the patient. He will need a full workup both  neurological and cardiac. From the cardiac standpoint, we will look at  his echocardiogram and rest of the workup can be done as an outpatient  including an event monitor and possibly a tilt table test as well as  possible ischemia workup. Depending on how the patient does clinically  and depending on the neurological evaluation, we will decide further  management. Thank you for allowing me to participate in the care of this patient.         Aneudy Ashby M.D.    D: 02/12/2021 15:13:20       T: 02/12/2021 15:24:50     [id number]  Job#: [id number]     Doc#: [id number]    CC:

## 2021-02-13 NOTE — PROGRESS NOTES
Hospitalist Progress Note    Patient:  Alin Dow      Unit/Bed:8B-37/037-A    YOB: 1965    MRN: [id number]       Acct: [de-identified]     PCP: Lee Jennings MD    Date of Admission: 2/11/2021    Assessment/Plan:    1. Syncopal episode--ACS ruled out by 2 (-) troponins; CT head does not reveal anything acute, carotid Doppler were essentially normal, EKG shows a sinus rhythm; appreciate cardiology input~recommending outpatient stress and tilt table test and placing an event monitor and he needs to follow-up in 6 weeks also no driving or operating heavy machinery until testing is complete and cleared, appreciate neurology input and they would like the patient to get an EEG and a neurology follow-up and okay for discharge; orthostatics were negative; he ambulated in the hallway without any issues; wants to go home and follow-up outpatient; no ectopy seen on telemetry  2. Trauma by fall secondary to #1--CT head and C-spine did not reveal anything acute;   3. Arthritis  4. Tobacco abuse--cessation counseling    Expected discharge date: Today/tomorrow    Disposition:    [x] Home       [] TCU       [] Rehab       [] Psych       [] SNF       [] [facility]       [] Other-    Chief Complaint: Passed out    Hospital Course:  This is a 49-year-old male patient who was at work, sitting in a chair eating lunch and the next thing he knew he was on the floor, patient cannot recall the incident, he did have loss of consciousness, a coworker stated \"they said I fell face first\"; coworker said that he must of slumped over and then he fell to the floor; he is a smoker however he also has arthritis but denies any other significant medical problems, currently he denies any lightheadedness or dizziness, headache, he does have a smoker's cough, denies chest pain or shortness of breath, denies nausea; cardiology has seen him and recommends neurological evaluation also so will consult neurology. Cardiology saw the patient and recommends outpatient stress and a possible tilt table test along with a Holter monitor; neurology saw and recommends an EEG and neurology follow-up and no driving or operating heavy machinery until his work-up is completed and okay for discharge; patient was ambulating in the hallway without any issues, he feels great, plan is for home with outpatient follow-up    Subjective (past 24 hours): Offers no complaints      Medications:  Reviewed    Infusion Medications    sodium chloride 100 mL/hr at 02/12/21 0125     Scheduled Medications    sodium chloride flush  10 mL Intravenous 2 times per day    enoxaparin  40 mg Subcutaneous Daily     PRN Meds: sodium chloride flush, promethazine **OR** ondansetron, polyethylene glycol, acetaminophen **OR** acetaminophen      Intake/Output Summary (Last 24 hours) at 2/13/2021 2710  Last data filed at 2/13/2021 0355  Gross per 24 hour   Intake 1946.7 ml   Output --   Net 1946.7 ml       Diet:  DIET GENERAL;    Exam:  BP (!) 133/94   Pulse 71   Temp 98.2 °F (36.8 °C) (Oral)   Resp 16   Ht 6' 2\" (1.88 m)   Wt 232 lb (105.2 kg)   SpO2 95%   BMI 29.79 kg/m²     General appearance: No apparent distress, appears stated age and cooperative. HEENT: Pupils equal, round, and reactive to light. Conjunctivae/corneas clear. Neck: Supple, with full range of motion. No jugular venous distention. Trachea midline. Respiratory:  Normal respiratory effort. Clear to auscultation, bilaterally without Rales/Wheezes/Rhonchi. Cardiovascular: Regular rate and rhythm with normal S1/S2 without murmurs, rubs or gallops. Abdomen: Soft, non-tender, non-distended with normal bowel sounds. Musculoskeletal: passive and active ROM x 4 extremities. Skin: Skin color, texture, turgor normal.    Neurologic:  Neurovascularly intact without any focal sensory/motor deficits.  Cranial nerves: II-XII intact, grossly non-focal.  Hand grasp remain equal bilaterally, straight leg raise intact and equal bilaterally  Psychiatric: Alert and oriented, thought content appropriate  Capillary Refill: Brisk,< 3 seconds   Peripheral Pulses: +2 palpable, equal bilaterally       Labs:   Recent Labs     02/11/21 2105   WBC 7.5   HGB 15.0   HCT 46.2        Recent Labs     02/11/21 2105 02/12/21  0356    144   K 3.6 4.1    108   CO2 28 25   BUN 13 12   CREATININE 1.2 1.1   CALCIUM 9.4 9.0     Recent Labs     02/11/21 2105   AST 18   ALT 22   BILITOT 0.3   ALKPHOS 121     Microbiology:    None    Radiology:  Echo Complete 2d W Doppler W Color    Result Date: 2/12/2021  Conclusions   Summary  Ejection fraction is visually estimated at 60%. Overall left ventricular function is normal.       Ct Head Wo Contrast    Result Date: 2/11/2021  Exam: CT brain without contrast Comparison: None Clinical history: Fall loss of consciousness Findings: The ventricles are normal in size and position. No intracranial masses or midline shift identified. No abnormal extra-axial fluid collections are seen. No acute intracranial hemorrhage. No skull fracture identified. The visualized paranasal sinuses are clear. Impression: 1. No acute intracranial hemorrhage or process identified. This document has been electronically signed by: Michael Browne MD on 02/11/2021 11:07 PM All CTs at this facility use dose modulation techniques and iterative reconstructions, and/or weight-based dosing when appropriate to reduce radiation to a low as reasonably achievable. Ct Cervical Spine Wo Contrast    Result Date: 2/11/2021  Exam: CT of the cervical spine without contrast Comparison: None Clinical history: Fall Findings: Alignment of the cervical spine is anatomic. No acute cervical spine fracture identified. No prevertebral soft tissue swelling is seen. Vertebral body height is normal. Endplate osteophytosis from C3-C6. Loss of intervertebral disc height at C3-4, C4-5 and C5-6.  The dens appears intact. The lateral masses are normally aligned. The facet joints are normally aligned. Impression: 1. No acute cervical spine fracture identified. 2.  Multilevel degenerative disease of the cervical spine This document has been electronically signed by: Flor Short MD on 02/11/2021 11:09 PM All CTs at this facility use dose modulation techniques and iterative reconstructions, and/or weight-based dosing when appropriate to reduce radiation to a low as reasonably achievable. Xr Chest Portable    Result Date: 2/11/2021  Exam: One View of the chest Comparison: None Findings: Hilar and mediastinal contours are within normal limits. Cardiac silhouette is not enlarged. No pneumothorax. No pleural fluid collection. No pneumonia. Impression: 1. No acute cardiopulmonary disease seen. This document has been electronically signed by: Flor Short MD on 02/11/2021 11:03 PM    Vl Dup Carotid Bilateral    Result Date: 2/12/2021  1. RIGHT ICA . Joyce Severance Joyce Severance Unremarkable . Joyce Severance 2. RIGHT ECA. Joyce Severance Unremarkable . .... Joyce Severance RIGHT CCA. Joyce Severance Unremarkable . Joyce Severance 3. RIGHT VERTEBRAL. Joyce Severance Antegrade flow . .. ... LEFT VERTEBRAL. .. Antegrade flow. .. 4. LEFT ICA. .... Unremarkable. . 5. LEFT ECA. .. Unremarkable. .... LEFT CCA. .. Unremarkable. **This report has been created using voice recognition software. It may contain minor errors which are inherent in voice recognition technology. ** Final report electronically signed by Dr. Howard Koroma on 2/12/2021 1:49 PM      DVT prophylaxis: [x] Lovenox                                 [] SCDs                                 [] SQ Heparin                                 [] Encourage ambulation           [] Already on Anticoagulation     Code Status: Full Code    Tele:   [x] yes SR HR 83             [] no    Active Hospital Problems    Diagnosis Date Noted    Syncope and collapse [R55] 02/11/2021     Condition: Stable    Disposition: Home    Time spent: Less than 30 minutes      Electronically signed by Artur Tierney ISABELLE Bolivar - CNP on 2/13/2021 at 7:02 AM

## 2021-02-13 NOTE — CONSULTS
NEUROLOGY CONSULT NOTE       Requesting Physician: ISABELLE Sorenson *     Reason for Consult:  Evaluate for \"dizziness\"    History of Present Illness:  Marylou Barajas is a 54 y.o. male admitted to [facility] on 2021. He was admitted after a syncopal episode. He was seated at work having his lunch and was just finishing up when he lost consciousness and fell off his chair, striking the left side of his face. He awoke to a coworker over him asking him if he was all right. No clear postictal confusion. The coworker brought him to the emergency room and he remembers all of these events. He had no warning before losing consciousness. No lightheadedness. Had felt fine that day. No nausea or abdominal pain has not had any recent diarrhea or fever. He does have a severe cough but was not coughing at the time of the event. No history of seizures. Visitor has never seen staring spells in which he would not respond. Past Medical History:        Diagnosis Date    Arthritis    Does not go to the doctor but our vital signs suggest hypertension      Procedure Laterality Date    ANKLE FRACTURE SURGERY Right 2017    ORIF    FRACTURE SURGERY      KNEE SURGERY         Social History:  Social History     Tobacco Use   Smoking Status Current Every Day Smoker    Packs/day: 0.50    Years: 30.00    Pack years: 15.00    Types: Cigarettes   Smokeless Tobacco Never Used     Social History     Substance and Sexual Activity   Alcohol Use No     Social History     Substance and Sexual Activity   Drug Use No        Employed; no medical insurance    Family History:       Problem Relation Age of Onset    Heart Attack Mother 35    Mult Sclerosis Mother    Mother  in her 46s. No siblings. No family history of tremor. Review of Systems:  CONSTITUTIONAL:  negative for fever or recent illness; low risk of Covid exposure as he works with just one other person.   EYE:  No recent visual changes  ENT:  negative for loss of smell sensation  RESPIRATORY:  negative for dyspnea; has had a bad cough for about a month  CARDIOVASCULAR:  negative for chest pain  GASTROINTESTINAL:  negative for nausea  HEMATOLOGIC/LYMPHATIC:  negative for unusual bleeding  MUSCULOSKELETAL: Arthralgias  SKIN: negative for rash  GENITOURINARY: negative for dysuria  NEUROLOGIC:  see HPI; his visitor also noted that he sometimes will stagger a little when he walk, and at times has a tremor of both hands. Allergies:    No Known Allergies     Current Medications:       sodium chloride flush 0.9 % injection 10 mL, 2 times per day      sodium chloride flush 0.9 % injection 10 mL, PRN      enoxaparin (LOVENOX) injection 40 mg, Daily      promethazine (PHENERGAN) tablet 12.5 mg, Q6H PRN    Or      ondansetron (ZOFRAN) injection 4 mg, Q6H PRN      polyethylene glycol (GLYCOLAX) packet 17 g, Daily PRN      acetaminophen (TYLENOL) tablet 650 mg, Q6H PRN    Or      acetaminophen (TYLENOL) suppository 650 mg, Q6H PRN       No medications prior to admission. Physical Exam:  /75   Pulse 66   Temp 97.6 °F (36.4 °C) (Oral)   Resp 18   Ht 6' 2\" (1.88 m)   Wt 232 lb (105.2 kg)   SpO2 98%   BMI 29.79 kg/m²  I Body mass index is 29.79 kg/m². I   Wt Readings from Last 1 Encounters:   02/11/21 232 lb (105.2 kg)   Four orthostatic blood pressure checks showed no drop in blood pressure  GENERAL: he is in no apparent distress, and appears stated age   EYE:  Fundi not examined due to the Covid-19 outbreak  CARDIOVASCULAR:  Heart regular rate and rhythm. No carotid bruits detected.   NEUROLOGIC:  Level of Alertness: alert  Orientation: oriented to person, place and time  Memory and Fund of Knowledge:  normal  Attention/Concentration: normal  Language: no aphasia  Cranial Nerves: pupils are equal; extraocular muscles intact; facial strength and sensation are intact; hearing is intact to soft voice; the palate raises midline, and the tongue protrudes midline; shoulder shrug is symmetric  Motor Exam: Normal tone in all extremities. Strength is MRC grade 5 in all extremities bilaterally. Sensory: Sensory symmetric to light touch  Coordination: Cerebellar function is intact for the nose-finger-nose maneuver, and for rapid alternating movements  Deep Tendon Reflexes: Very brisk and symmetric  Plantar Responses:  Downgoing  Abnormal movements: none  Station and gait: Normal    Diagnostics:  CBC:   Lab Results   Component Value Date    WBC 7.5 02/11/2021    RBC 4.98 02/11/2021    HGB 15.0 02/11/2021    HCT 46.2 02/11/2021    MCV 92.8 02/11/2021    MCH 30.1 02/11/2021    MCHC 32.5 02/11/2021    RDW 14.7 04/24/2017     02/11/2021    MPV 9.4 02/11/2021     CMP:    Lab Results   Component Value Date     02/12/2021    K 4.1 02/12/2021     02/12/2021    CO2 25 02/12/2021    BUN 12 02/12/2021    CREATININE 1.1 02/12/2021    LABGLOM 69 02/12/2021    GLUCOSE 99 02/12/2021    PROT 8.0 02/11/2021    LABALBU 4.4 02/11/2021    CALCIUM 9.0 02/12/2021    BILITOT 0.3 02/11/2021    ALKPHOS 121 02/11/2021    AST 18 02/11/2021    ALT 22 02/11/2021       CT brain images reviewed: Normal  Carotid ultrasound showed no significant stenosis  Echocardiogram unremarkable      Impression:  Syncope without prodrome and without postictal confusion. The lack of warning symptoms could raise concern for the possibility of seizure, but the brevity of the episode and lack of confusion afterward go against this. Discharge is planned tonight with further cardiac work-up to be done as an outpatient. Recommendations : If further cardiac work-up planned is unremarkable, I would get an EEG and a neurology follow-up. I advised the patient he should not drive until his work-up is completed. Okay for discharge at this time. I appreciate the opportunity to participate in this pleasant patient's care.     Electronically signed by Delia Parker DO Juli on 2/13/2021 at 5:49 PM

## 2021-02-14 LAB — ANA SCREEN: NORMAL

## 2021-02-15 ENCOUNTER — TELEPHONE (OUTPATIENT)
Dept: CARDIOLOGY CLINIC | Age: 56
End: 2021-02-15

## 2021-02-15 NOTE — TELEPHONE ENCOUNTER
Per Alphonse Villavicencio pt needs 6 week fu appt with madan Capps for pt to call office     [place] is [place]

## 2021-02-19 ENCOUNTER — HOSPITAL ENCOUNTER (OUTPATIENT)
Dept: NON INVASIVE DIAGNOSTICS | Age: 56
Discharge: HOME | End: 2021-02-19

## 2021-02-19 DIAGNOSIS — R55 SYNCOPE AND COLLAPSE: ICD-10-CM

## 2021-02-19 LAB
LV EF: 56 %
LVEF MODALITY: NORMAL

## 2021-02-19 PROCEDURE — A9500 TC99M SESTAMIBI: HCPCS | Performed by: NURSE PRACTITIONER

## 2021-02-19 PROCEDURE — 78452 HT MUSCLE IMAGE SPECT MULT: CPT

## 2021-02-19 PROCEDURE — 3430000000 HC RX DIAGNOSTIC RADIOPHARMACEUTICAL: Performed by: NURSE PRACTITIONER

## 2021-02-19 PROCEDURE — 93017 CV STRESS TEST TRACING ONLY: CPT | Performed by: NUCLEAR MEDICINE

## 2021-02-19 RX ADMIN — Medication 9.1 MILLICURIE: at 12:10

## 2021-02-19 RX ADMIN — Medication 30.2 MILLICURIE: at 13:20

## 2021-03-08 ENCOUNTER — TELEPHONE (OUTPATIENT)
Dept: CARDIOLOGY CLINIC | Age: 56
End: 2021-03-08

## 2021-03-08 ENCOUNTER — HOSPITAL ENCOUNTER (OUTPATIENT)
Dept: NON INVASIVE DIAGNOSTICS | Age: 56
Discharge: HOME | End: 2021-03-08
Payer: COMMERCIAL

## 2021-03-08 PROCEDURE — 93660 TILT TABLE EVALUATION: CPT

## 2021-03-09 ENCOUNTER — TELEPHONE (OUTPATIENT)
Dept: CARDIOLOGY CLINIC | Age: 56
End: 2021-03-09

## 2021-03-09 NOTE — LETTER
[address] [facility]  [facility] [address] [place] [address]  Phone: [phone]  Fax: [phone]    Desi Kohler MD        March 10, 2021     Patient: Javi Zayas   YOB: 1965   Date of Visit: 3/9/2021       To Whom it May Concern:    Ady Walters was unable to work from 2/11/21 through 3/8/21. He may return back to work from a cardiac standpoint on 3/9/21. If you have any questions or concerns, please don't hesitate to call.     Sincerely,         Desi Kohler MD

## 2021-03-09 NOTE — TELEPHONE ENCOUNTER
Pt wife Dalton Almazan called asking if pt could have a letter when told not to work for the [employer] for when pt was off. Pt has not had an office appt but was seen in [facility]. Dr. Tylor Claudio advise?

## 2021-03-31 ENCOUNTER — TELEPHONE (OUTPATIENT)
Dept: CARDIOLOGY CLINIC | Age: 56
End: 2021-03-31

## 2021-03-31 NOTE — LETTER
[address] [facility]  [facility]  [address]  [facility] [address]  Phone: [phone]  Fax: [phone]      March 31, 2021     [address] [place] [address]  [address] [facility]      Dear Kandis Damon:    I am writing you because I have been informed of your missed appointment(s). We care about you and the management of your healthcare and want to make sure that you follow up as recommended. We're sorry you were unable to keep your appointment and hope that you are doing well. We would like to continue treating your healthcare needs. Please call the office to let us know your plans for treatment and to reschedule your appointment.      Sincerely,        Matthew Echavarria MD

## 2025-09-03 ENCOUNTER — APPOINTMENT (OUTPATIENT)
Dept: CT IMAGING | Age: 60
End: 2025-09-03
Payer: OTHER MISCELLANEOUS

## 2025-09-03 ENCOUNTER — HOSPITAL ENCOUNTER (EMERGENCY)
Age: 60
Discharge: HOME | End: 2025-09-03
Attending: EMERGENCY MEDICINE
Payer: OTHER MISCELLANEOUS

## 2025-09-03 VITALS
SYSTOLIC BLOOD PRESSURE: 139 MMHG | HEART RATE: 84 BPM | OXYGEN SATURATION: 99 % | WEIGHT: 200 LBS | DIASTOLIC BLOOD PRESSURE: 77 MMHG | TEMPERATURE: 97.7 F | HEIGHT: 74 IN | BODY MASS INDEX: 25.67 KG/M2 | RESPIRATION RATE: 19 BRPM

## 2025-09-03 DIAGNOSIS — M51.369 DEGENERATION OF INTERVERTEBRAL DISC OF LUMBAR REGION, UNSPECIFIED WHETHER PAIN PRESENT: ICD-10-CM

## 2025-09-03 DIAGNOSIS — S16.1XXA ACUTE STRAIN OF NECK MUSCLE, INITIAL ENCOUNTER: ICD-10-CM

## 2025-09-03 DIAGNOSIS — S39.012A STRAIN OF LUMBAR REGION, INITIAL ENCOUNTER: ICD-10-CM
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
## 2025-09-03 PROCEDURE — 72125 CT NECK SPINE W/O DYE: CPT

## 2025-09-03 PROCEDURE — 72131 CT LUMBAR SPINE W/O DYE: CPT

## 2025-09-03 PROCEDURE — 6370000000 HC RX 637 (ALT 250 FOR IP): Performed by: EMERGENCY MEDICINE

## 2025-09-03 RX ORDER — ACETAMINOPHEN 325 MG/1
650 TABLET ORAL ONCE
Status: COMPLETED | OUTPATIENT
Start: 2025-09-03 | End: 2025-09-03

## 2025-09-03 RX ORDER — IBUPROFEN 800 MG/1
800 TABLET, FILM COATED ORAL EVERY 8 HOURS PRN
Qty: 45 TABLET | Refills: 0 | Status: SHIPPED | OUTPATIENT
Start: 2025-09-03

## 2025-09-03 RX ORDER — CYCLOBENZAPRINE HCL 10 MG
10 TABLET ORAL 3 TIMES DAILY PRN
Qty: 21 TABLET | Refills: 0 | Status: SHIPPED | OUTPATIENT
Start: 2025-09-03 | End: 2025-09-13

## 2025-09-03 RX ADMIN — ACETAMINOPHEN 650 MG: 325 TABLET ORAL at 13:09

## 2025-09-03 ASSESSMENT — PAIN DESCRIPTION - LOCATION: LOCATION: BACK;NECK

## 2025-09-03 ASSESSMENT — PAIN DESCRIPTION - ORIENTATION: ORIENTATION: LOWER

## 2025-09-03 ASSESSMENT — PAIN SCALES - GENERAL
PAINLEVEL_OUTOF10: 6
PAINLEVEL_OUTOF10: 6

## 2025-09-03 ASSESSMENT — PAIN - FUNCTIONAL ASSESSMENT
PAIN_FUNCTIONAL_ASSESSMENT: 0-10
PAIN_FUNCTIONAL_ASSESSMENT: 0-10